# Patient Record
Sex: MALE | Race: WHITE | NOT HISPANIC OR LATINO | Employment: FULL TIME | URBAN - NONMETROPOLITAN AREA
[De-identification: names, ages, dates, MRNs, and addresses within clinical notes are randomized per-mention and may not be internally consistent; named-entity substitution may affect disease eponyms.]

---

## 2017-01-27 ENCOUNTER — TRANSCRIBE ORDERS (OUTPATIENT)
Dept: PREOP | Facility: HOSPITAL | Age: 27
End: 2017-01-27

## 2017-01-27 DIAGNOSIS — N50.89 TESTICULAR MASS: Primary | ICD-10-CM

## 2017-08-09 ENCOUNTER — OFFICE VISIT (OUTPATIENT)
Dept: FAMILY MEDICINE CLINIC | Facility: CLINIC | Age: 27
End: 2017-08-09

## 2017-08-09 ENCOUNTER — TELEPHONE (OUTPATIENT)
Dept: FAMILY MEDICINE CLINIC | Facility: CLINIC | Age: 27
End: 2017-08-09

## 2017-08-09 ENCOUNTER — DOCUMENTATION (OUTPATIENT)
Dept: FAMILY MEDICINE CLINIC | Facility: CLINIC | Age: 27
End: 2017-08-09

## 2017-08-09 VITALS
HEIGHT: 66 IN | WEIGHT: 167.8 LBS | OXYGEN SATURATION: 98 % | DIASTOLIC BLOOD PRESSURE: 68 MMHG | SYSTOLIC BLOOD PRESSURE: 102 MMHG | BODY MASS INDEX: 26.97 KG/M2 | TEMPERATURE: 98.7 F | HEART RATE: 68 BPM

## 2017-08-09 DIAGNOSIS — Z02.1 PHYSICAL EXAM, PRE-EMPLOYMENT: Primary | ICD-10-CM

## 2017-08-09 LAB
BILIRUB BLD-MCNC: NEGATIVE MG/DL
CLARITY, POC: CLEAR
COLOR UR: YELLOW
GLUCOSE UR STRIP-MCNC: NEGATIVE MG/DL
KETONES UR QL: NEGATIVE
LEUKOCYTE EST, POC: NEGATIVE
NITRITE UR-MCNC: NEGATIVE MG/ML
PH UR: 6.5 [PH] (ref 5–8)
PROT UR STRIP-MCNC: NEGATIVE MG/DL
RBC # UR STRIP: ABNORMAL /UL
SP GR UR: 1.02 (ref 1–1.03)
UROBILINOGEN UR QL: NORMAL

## 2017-08-09 PROCEDURE — PEPHY: Performed by: NURSE PRACTITIONER

## 2017-08-09 PROCEDURE — 81003 URINALYSIS AUTO W/O SCOPE: CPT | Performed by: NURSE PRACTITIONER

## 2017-08-09 RX ORDER — TERAZOSIN 1 MG/1
CAPSULE ORAL
COMMUNITY
Start: 2017-05-07

## 2017-08-09 NOTE — PATIENT INSTRUCTIONS
Testicular Cancer  Testicular cancer is a disease in which cancer (malignant) cells form in the tissues of one or both testicles. The testicles are the male sex glands and produce testosterone and sperm. Testicular cancer is the most common cancer in men 20-35 years old.  RISK FACTORS  · Having had an undescended testicle.  · Having had abnormal development of the testicles.  · Having had testicular cancer in the past.  · Having a family history of testicular cancer, especially a father or brother.  · Having Klinefelter syndrome.  · Being white.  SIGNS AND SYMPTOMS   · Swelling in your scrotum.  · A change in how your testicle feels.  · A painless bump or swelling in the testicle.  · Dull ache in the lower abdomen.  · Pain or discomfort in the testicles or scrotum.  · Sudden buildup of fluid in the scrotum.  DIAGNOSIS   Tests that examine the testicles and blood are used to find and diagnose testicular cancer. Your health care provider will perform a physical exam to check your testicles for lumps, swelling, or pain. He or she will ask about your health history. Your health care provider may also perform:  · Ultrasonography of the testicles.  · A blood test called the serum tumor marker test. This test looks for certain substances in the blood linked to specific types of cancer.  · A biopsy to remove the entire testicle (radical inguinal orchiectomy and biopsy) to test a tissue sample from the testicle for cancer cells.  TREATMENT   Once your cancer has been diagnosed and staged, you should discuss a treatment plan with your health care provider. Based on the stage of the cancer, one treatment or a combination of treatments may be recommended. The most common forms of treatment are:  · Surgery.  · Radiation therapy.  · Chemotherapy.  · High-dose chemotherapy followed by stem cell transplant.  Certain treatments for testicular cancer can cause infertility that may be permanent. Talk to your health care provider about  the risks of treatment and your options if you want to have children.  HOME CARE INSTRUCTIONS  · Only take over-the-counter or prescription medicines as directed by your health care provider.  · Maintain a healthy diet.  · Consider joining a support group. This may help you learn to cope with the stress of having testicular cancer.  · Seek advice to help you manage treatment side effects.  · Keep all follow-up appointments as directed by your health care provider.  SEEK MEDICAL CARE IF:   · You have a dull ache in your lower abdomen or groin.  · You have a sudden buildup of fluid in your scrotum.  · You have an increase in pain, discomfort, or swelling in your testicle or scrotum.     This information is not intended to replace advice given to you by your health care provider. Make sure you discuss any questions you have with your health care provider.     Document Released: 11/10/2006 Document Revised: 10/08/2014 Document Reviewed: 06/06/2014  [employer] Interactive Patient Education ©2017 [employer]

## 2017-08-09 NOTE — PROGRESS NOTES
Chief Complaint   Patient presents with   • Employment Physical          Chief Complaint   Patient presents with   • Employment Physical        Allergies   Allergen Reactions   • Demerol [Meperidine]        HPI: Deni Mejia is a 27 y.o. male presents today for an employment physical to work at the local [employer].  He has had several injuries in the past (R fx humerus (2010), Fx R forearm (1997), Concussion (2010) ).   Has had multiple surgeries as listed below.  Had Testicular cancer last year is in remission and goes to [facility] every 3 months to follow.  No problems today.       Past Medical History:   Diagnosis Date   • Cancer    • Testicular cancer      Past Surgical History:   Procedure Laterality Date   • HERNIA REPAIR     • LIPOMA EXCISION     • ORCHIECTOMY     • ORIF HUMERUS FRACTURE     • TESTICLE SURGERY Right    • URETHROTOMY       Social History     Social History   • Marital status: Single     Spouse name: N/A   • Number of children: N/A   • Years of education: N/A     Social History Main Topics   • Smoking status: Former Smoker   • Smokeless tobacco: Never Used   • Alcohol use Yes   • Drug use: No   • Sexual activity: Defer     Other Topics Concern   • None     Social History Narrative   • None     Family History   Problem Relation Age of Onset   • No Known Problems Mother    • No Known Problems Father    • No Known Problems Sister    • No Known Problems Brother    • No Known Problems Maternal Grandmother    • Diabetes Maternal Grandfather    • No Known Problems Paternal Grandmother    • Alzheimer's disease Paternal Grandfather        No current outpatient prescriptions on file prior to visit.     No current facility-administered medications on file prior to visit.         REVIEW OF SYMPTOMS: (Positives bolded) ALL NEGATIVE TODAY  General:  weight loss, fever, chills, night sweats, fatigue, appetite loss  Cardiovascular:  chest pain, PND, palpitation, edema, orthopnea, syncope, swelling of  "extremities  Gastro: Nausea, vomiting, diarrhea, hematemesis, abdominal pain, constipation  Genito: hematuria, dysuria, glycosuria, hesitancy, frequency, incontinence  Psychiatric: depression, anxiety, anti-depressants, alcohol abuse, drug abuse,   \"All other systems reviewed and negative, except as listed above.”    The following portions of the patient's history were reviewed and updated as appropriate: allergies, current medications, past family history, past medical history, past social history, past surgical history and problem list.    OBJECTIVE:  Constitutional:  Appearance-No acute distress, Consistent with stated age. Orientation- Oriented x 3, alert Posture-Not doubled over. Gait-Normal pace, normal arm movement. Posture- Normal Build and Nutrition-Well developed and well nourished.  General- Patient is pleasant and cooperative with the interview and exam.    Integumentary: General-No rashes, ulcers or lesions. No edema.  Palpation- Normal skin moisture/turgor. Skin is warm to touch, no increased warmth. Capillary refill is normal bilateral Upper and lower extremity.     Head/Neck: Head- normocephalic and atraumatic.  Neck- without visible/palpable lumps or pulsations.  Palpation- No bony tenderness about head/neck along frontal, occiptial, temporal, parietal, mastoid, jawline, zygoma, orbit or any other location.  NO temporal artery tenderness. No TMJ tenderness. Normal cervical ROM.   Neck Supple.  Thyroid-No thyromegaly, no nodules    Eye: Bilaterally PERRLA, EOMI.  No discharge.  Upper and lower eyelids are normal. Sclera/conjunctiva normal without discharge. Cornea is normal and clear. Lens is normal.  Eyeball appears normal. No ciliary flushing, no conjunctival injection.    ENMT:  Pinna- normal without tenderness or erythema.  External auditory canal Left- normal without erythema or discharge, no excessive cerumen. External auditory canal Right-normal without erythema or discharge, no excessive " cerumen. TM left- Grey/pearly, normal light reflex and anatomy TM Right- Grey/pearly, normal light reflex and anatomy Hearing Assessment-normal to conversational speech at 2-5 feet.  Nose and sinus-No sinus tenderness along frontal/maxillary region. External appearance normal and midline. Nares- bilateral quiet airflow, no discharge. Nasal mucosa- No bleeding noted and no ulcerations observed. Pink, moist. Turbinates non boggy. Lips- normal color, moist without cracks/lesions Oral Cavity/Palate- hard/soft palate intact without lesions, oral mucosa pink and moist. Dentition assessed and discussed appropriate oral care. Tongue normal midline.  Oropharynx- no pharyngeal erythema, Uvula midline. No post nasal drip. No exudate. Salivary glands- Non tender to palpation    CHEST/LUNG: Inspection- symmetric chest wall no pectus deformity. Normal effort, no distress, no use of accessory muscles. Palpation- nontender sternum, ribline.  No abnormal pulsations. Auscultation- Breath sounds normal throughout all lung fields.  Normal tracheal sounds, Normal bronchial sounds overlying sternum, Bronchovessicular sounds normal between scapulae posteriorly, Normal vessicular breath sounds heard throughout periphery. Lungs are clear today. Adventitious sounds- No wheezes, rales, rhonchi.     CARDIOVASCULAR:  Carotid artery- normal, no bruits or abnormal pulsations. Jugular vein- no pulsations. Palpation/Percussion- Normal PMI, no palpable thrill  Auscultation- Regular rate and rhythm. No murmur noted in sitting, supine positions. Extremities- no digital clubbing, cyanosis, edema, increased warmth.    ABDOMEN: Inspection- normal and no visible pulsations. Normal contour. Auscultation- Bowel sounds normal, no abdominal bruits. Palpation/Percussion- soft, non-tender, no rebound tenderness, no rigidity (guarding), no jar tenderness, no masses.  Liver-no hepatomegaly, Spleen - no splenomegaly, Hernias- none. Rectal not examined.      Peripheral Vascular: Upper extremity Left- Normal temperature with pink nailbeds and no ulcerations.  Upper extremity Right- Normal temperature with pink nailbeds and no ulcerations.  Lower extremity- Normal temperature with pink nailbeds and no ulcerations. DP pulses 2+ bilaterally.  Pedal hair intact.  Normal capillary refill. Edema- No edema.    Musculoskeletal: Generalized-No generalized swelling or edema of extremities, no digital clubbing or cyanosis, neurovascularly intact all four extremities.  Upper extremity- Symmetrical posture.  No visible deformity.  Normal sensation along medial and lateral upper extremity proximally and distally.  NO tenderness overlying shoulder, lateral/medial epicondyle.  5/5 and strength 5/5 bilateral UE.  Elbow palpated, no tenderness overlying olecranon.  Normal supination, pronation to active/passive ROM and to resisted rotation. Bicep insertion/tricep insertion appear normal without obvious pathology. Rotator cuff evaluated and intact.  Normal wrist ROM bilaterally. Normal hand movement, intrinsic muscles of hands normal. No tenderness to palpation of hands/wrists/elbows.  Lower extremity- Not tender to palpation, no pain, no swelling, edema or erythema of surrounding tissue, normal strength and tone.  Normal appearing hip ROM bilaterally without pain.  Knee ROM normal at 0-120 degrees. No tenderness overlying trochanters, no tenderness about patella, quad tendon, patellar tendon.  No tenderness at tibial tuberosity. Ankle normal ROM not tender to palpation along medial/lateral malleolus. Normal movement of toes, no tenderness bilateral feet/toes.  Normal foot type. Calves symmetrical.  Stretching demonstrated today.  Spine/Ribs- No deformities, masses or tenderness, no known fractures, normal strength, Normal ROM. Normal stability No tenderness along C/T/L spine.  Normal appearing ROM about spine.      Neurological: General- Moves all 4 extremities symmetrically.  "Symmetrical face and body posture. Cranial nerves- individually evaluated II-XII and intact. PERRLA, Normal EOMI, visual/special senses appear intact, Face is symmetrical and normal sensation/movement, normal tongue, normal strength/posture of neck musculature. Balance- Romberg intact.  Reflexes- ntact with DTR 2+ patellar, Achilles, bicep, brachial,tricep. Ankle clonus normal with 2 beats.  Strength- 5/5 bilateral UE and LE. Soft touch- intact bilateral UE and LE.  Temperature sensation- intact bilateral UE and LE. Cerebellar testing-Rapid alternating movements intact.  Heel shin intact. Able to walk normal gait, normal heel toe walking. Neck- supple.      Neuropsych: Oriented- Person, place, time. (AAOx3), Mood/affect- normal and congruent. Able to articulate well. Speech-Normal speech, normal rate, normal tone, normal use of language, volume and coherence.  Thought content- normal with ability to perform basic computations and apply abstract thought/reason. Associations- intact, no SI/HI, no hallucinations, delusions, obsessions.  Judgment/insight- Appropriate. Memory-Recall intact, remote and recent memory intact. Knowledge- Age appropriate fund of knowledge, concentration and attention span normal.    Lymphatic: Head/Neck- normal size and non tender to palpation. Axillary- Head and neck LN are normal size and non tender to palpation. Femoral and Inguinal- normal size and non tender to palpation.    /68  Pulse 68  Temp 98.7 °F (37.1 °C) (Oral)   Ht 66\" (167.6 cm)  Wt 167 lb 12.8 oz (76.1 kg)  SpO2 98%  BMI 27.08 kg/m2       ASSESSMENT/PLAN  Dnei was seen today for employment physical.    Diagnoses and all orders for this visit:    Physical exam, pre-employment  -     POCT urinalysis dipstick, automated, has some hematuria in it, but has history of testicular cancer in remission. He is going to tell them at [facility] that he has blood in urine.      POCT urinalysis dipstick, automated   Order: " [id number]   Status:  Final result   Visible to patient:  No (Not Released) Dx:  Physical exam, pre-employment      Ref Range & Units 8:14 AM     Color Yellow, Straw, Dark Yellow, [place] Yellow   Clarity, UA Clear Clear   Glucose, UA Negative, 1000 mg/dL (3+) mg/dL Negative   Bilirubin Negative Negative   Ketones, UA Negative Negative   Specific Gravity  1.005 - 1.030 1.020   Blood, UA Negative Small (A)   pH, Urine 5.0 - 8.0 6.5   Protein, POC Negative mg/dL Negative   Urobilinogen, UA Normal Normal   Leukocytes Negative Negative   Nitrite, UA Negative Negative   Resulting [facility]      Specimen Collected: 08/09/17  8:14 AM Last Resulted: 08/09/17  8:14 AM                Vision Test:   20/10  Color Test:    Passed    Health:  Exercise daily at least 30 minutes 3x week  Lose weight- decrease calories in diet, eat healthier    Return in about 1 year (around 8/9/2018), or if symptoms worsen or fail to improve.      Britt Strickland, DNP, APRN-BC  08/09/2017